# Patient Record
(demographics unavailable — no encounter records)

---

## 2025-02-14 NOTE — HISTORY OF PRESENT ILLNESS
[FreeTextEntry1] : Mr. YOLI JENSEN was seen today at the [facility]. He is a 69 year old man that was referred for evaluation of PVCs on ECG.  Mr. CHACON has no significant past medical history and exercises regularly (hockey skating every Sunday).  At a routine physical ECG demonstrated frequent PVCs and he was referred to a cardiologist.  His close friend is my close friend and arranged for him to be seen at our office on an urgent basis. Today he feels well and he has no complaints  Past Medical History None  Past Surgical History Hernia repair as a child  Medications None  All None  Review of Systems: Constitutional: Negative.  HENT: Negative.  Eyes: Negative.  Respiratory: Negative.  Cardiovascular: Negative.  Gastrointestinal: Negative.  Endocrine: Negative.  Genitourinary: Negative.  Musculoskeletal: Negative.  Skin: Negative.  Allergic/Immunologic: Negative.  Neurological: Negative.  Hematological: Negative.  Psychiatric/Behavioral: Negative.  Constitutional: WN WD WF NAD Eyes: Sclera white. PERRLA. EOMI. ENMT: No obvious oral lesions. Oropharynx clear. No palpable neck masses. Extremities: No C/C/E. CV: No JVD or carotid bruit. PMI nl. S1S2 RRR No M/R/H/G Lungs: CTA & P Abd: +BS, NT/ND no HSM : Deferred Skin: no lesions or rash Neuro: A&Ox3, non focal Psych: no abnl behaviors during exam  ECG - NSR RBBB with left axis deviation and PVCs that have a RBIA and positivity accross the precordium  Echo- prelim result preserved LVEF without significant valvular abnormalities

## 2025-02-14 NOTE — ASSESSMENT
[FreeTextEntry1] : Impression 1. Frequent VPCs- asymptomatic  Plan Mr. Vera has frequent RBIA PVCs on ECG.  Echo demonstrates a preserved LVEF.  Recommend ziopatch monitor to assess PVC frequency followed by evaluation by Dr. Colindres.  Ultimately would obtain cardiacMRI.

## 2025-05-28 NOTE — DISCUSSION/SUMMARY
[FreeTextEntry1] : Impression 1. PVCs with 5% burden and structurally normal heart 2. Palpitations resolved on beta blocker therapy - of note palpitation symptoms on ziopatch correlated with periods of sinus tachycardia.  3. RBBB/LAFB  Plan Mr. GUEVARA is doing well on beta blocker therapy and will continue follow up with Dr Colindres.  Follow up PRN. [EKG obtained to assist in diagnosis and management of assessed problem(s)] : EKG obtained to assist in diagnosis and management of assessed problem(s)

## 2025-05-28 NOTE — HISTORY OF PRESENT ILLNESS
[FreeTextEntry1] : Mr. YOLI JENSEN was seen today at the [facility]. He is a 69 year old man that was referred for evaluation of PVCs on ECG.  Mr. CHACON has no significant past medical history and exercises regularly (hockey skating every Sunday).  At a routine physical ECG demonstrated frequent PVCs and he was referred to a cardiologist.  His close friend is my close friend and arranged for him to be seen at our office on an urgent basis. Work up revealed a preserved LVEF on echo,  Mr Jensen is followed by Dr Colindres and since starting Toprol 25 mg daily he reports resolution of symptoms.  Past Medical History None  Past Surgical History Hernia repair as a child  Medications None  All None  Review of Systems: Constitutional: Negative.  HENT: Negative.  Eyes: Negative.  Respiratory: Negative.  Cardiovascular: Negative.  Gastrointestinal: Negative.  Endocrine: Negative.  Genitourinary: Negative.  Musculoskeletal: Negative.  Skin: Negative.  Allergic/Immunologic: Negative.  Neurological: Negative.  Hematological: Negative.  Psychiatric/Behavioral: Negative.  Constitutional: WN WD WF NAD Eyes: Sclera white. PERRLA. EOMI. ENMT: No obvious oral lesions. Oropharynx clear. No palpable neck masses. Extremities: No C/C/E. CV: No JVD or carotid bruit. PMI nl. S1S2 RRR No M/R/H/G Lungs: CTA & P Abd: +BS, NT/ND no HSM : Deferred Skin: no lesions or rash Neuro: A&Ox3, non focal Psych: no abnl behaviors during exam  ECG - NSR RBBB with left axis deviation and no ectopy.  ZIopatch-  VPCs and NSVT with burden 5% Periods of bigeminy lasting for hours with ectopy during nightime hours. Several days of sinus rhtyhm without ectopy. Most symptoms reports during sinus tachycardia at 110bpm  Echo- 2025 1. Left ventricular cavity is normal in size. Left ventricular wall thickness is normal. Left ventricular systolic function is normal with an ejection fraction of 55 % by Villa's method of disks. There are no regional wall motion abnormalities seen. 2. Normal left ventricular diastolic function, with normal left ventricular filling pressure. 3. Normal right ventricular cavity size, with normal wall thickness, and normal right ventricular systolic function. 4. Ascending aorta is dilated, measuring 3.70 cm (indexed 1.76 cm/m). 5. Mild tricuspid regurgitation. 6. No pericardial effusion seen.